# Patient Record
Sex: FEMALE | Race: WHITE
[De-identification: names, ages, dates, MRNs, and addresses within clinical notes are randomized per-mention and may not be internally consistent; named-entity substitution may affect disease eponyms.]

---

## 2018-11-09 ENCOUNTER — HOSPITAL ENCOUNTER (OUTPATIENT)
Dept: HOSPITAL 97 - ER | Age: 68
Setting detail: OBSERVATION
LOS: 4 days | Discharge: HOME | End: 2018-11-13
Attending: INTERNAL MEDICINE | Admitting: INTERNAL MEDICINE
Payer: COMMERCIAL

## 2018-11-09 VITALS — BODY MASS INDEX: 28.9 KG/M2

## 2018-11-09 DIAGNOSIS — R09.02: ICD-10-CM

## 2018-11-09 DIAGNOSIS — J44.1: Primary | ICD-10-CM

## 2018-11-09 DIAGNOSIS — I10: ICD-10-CM

## 2018-11-09 DIAGNOSIS — E78.5: ICD-10-CM

## 2018-11-09 DIAGNOSIS — J96.11: ICD-10-CM

## 2018-11-09 DIAGNOSIS — Z23: ICD-10-CM

## 2018-11-09 LAB
ALBUMIN SERPL BCP-MCNC: 4.2 G/DL (ref 3.4–5)
ALP SERPL-CCNC: 81 U/L (ref 45–117)
ALT SERPL W P-5'-P-CCNC: 42 U/L (ref 12–78)
AST SERPL W P-5'-P-CCNC: 27 U/L (ref 15–37)
BUN BLD-MCNC: 13 MG/DL (ref 7–18)
GLUCOSE SERPLBLD-MCNC: 93 MG/DL (ref 74–106)
HCT VFR BLD CALC: 46.2 % (ref 36–45)
INR BLD: 0.92
LYMPHOCYTES # SPEC AUTO: 0.7 K/UL (ref 0.7–4.9)
MAGNESIUM SERPL-MCNC: 1.9 MG/DL (ref 1.8–2.4)
MCH RBC QN AUTO: 31.8 PG (ref 27–35)
MCV RBC: 95.2 FL (ref 80–100)
NT-PROBNP SERPL-MCNC: 285 PG/ML (ref ?–125)
PMV BLD: 8 FL (ref 7.6–11.3)
POTASSIUM SERPL-SCNC: 4.1 MMOL/L (ref 3.5–5.1)
RBC # BLD: 4.85 M/UL (ref 3.86–4.86)
TROPONIN (EMERG DEPT USE ONLY): 0.11 NG/ML (ref 0–0.04)

## 2018-11-09 PROCEDURE — 99285 EMERGENCY DEPT VISIT HI MDM: CPT

## 2018-11-09 PROCEDURE — 93005 ELECTROCARDIOGRAM TRACING: CPT

## 2018-11-09 PROCEDURE — 90670 PCV13 VACCINE IM: CPT

## 2018-11-09 PROCEDURE — 87040 BLOOD CULTURE FOR BACTERIA: CPT

## 2018-11-09 PROCEDURE — 87070 CULTURE OTHR SPECIMN AEROBIC: CPT

## 2018-11-09 PROCEDURE — 94760 N-INVAS EAR/PLS OXIMETRY 1: CPT

## 2018-11-09 PROCEDURE — 96372 THER/PROPH/DIAG INJ SC/IM: CPT

## 2018-11-09 PROCEDURE — 84484 ASSAY OF TROPONIN QUANT: CPT

## 2018-11-09 PROCEDURE — 94640 AIRWAY INHALATION TREATMENT: CPT

## 2018-11-09 PROCEDURE — 96374 THER/PROPH/DIAG INJ IV PUSH: CPT

## 2018-11-09 PROCEDURE — 83735 ASSAY OF MAGNESIUM: CPT

## 2018-11-09 PROCEDURE — 96375 TX/PRO/DX INJ NEW DRUG ADDON: CPT

## 2018-11-09 PROCEDURE — 80076 HEPATIC FUNCTION PANEL: CPT

## 2018-11-09 PROCEDURE — 87205 SMEAR GRAM STAIN: CPT

## 2018-11-09 PROCEDURE — 87804 INFLUENZA ASSAY W/OPTIC: CPT

## 2018-11-09 PROCEDURE — 80048 BASIC METABOLIC PNL TOTAL CA: CPT

## 2018-11-09 PROCEDURE — 71045 X-RAY EXAM CHEST 1 VIEW: CPT

## 2018-11-09 PROCEDURE — 36415 COLL VENOUS BLD VENIPUNCTURE: CPT

## 2018-11-09 PROCEDURE — 71275 CT ANGIOGRAPHY CHEST: CPT

## 2018-11-09 PROCEDURE — 85025 COMPLETE CBC W/AUTO DIFF WBC: CPT

## 2018-11-09 PROCEDURE — 83880 ASSAY OF NATRIURETIC PEPTIDE: CPT

## 2018-11-09 PROCEDURE — 85610 PROTHROMBIN TIME: CPT

## 2018-11-09 PROCEDURE — 82805 BLOOD GASES W/O2 SATURATION: CPT

## 2018-11-09 RX ADMIN — ENOXAPARIN SODIUM SCH: 80 INJECTION SUBCUTANEOUS at 20:55

## 2018-11-09 RX ADMIN — Medication SCH ML: at 21:08

## 2018-11-09 RX ADMIN — FAMOTIDINE SCH: 10 INJECTION, SOLUTION INTRAVENOUS at 20:55

## 2018-11-09 RX ADMIN — METOPROLOL TARTRATE SCH: 25 TABLET ORAL at 18:00

## 2018-11-09 RX ADMIN — METHYLPREDNISOLONE SODIUM SUCCINATE SCH: 40 INJECTION, POWDER, FOR SOLUTION INTRAMUSCULAR; INTRAVENOUS at 17:00

## 2018-11-09 RX ADMIN — LEVOFLOXACIN SCH: 5 INJECTION, SOLUTION INTRAVENOUS at 16:00

## 2018-11-09 NOTE — EKG
Test Date:    2018-11-09               Test Time:    16:17:35

Technician:   VINNIE                                     

                                                     

MEASUREMENT RESULTS:                                       

Intervals:                                           

Rate:         91                                     

VA:           140                                    

QRSD:         80                                     

QT:           398                                    

QTc:          489                                    

Axis:                                                

P:            73                                     

VA:           140                                    

QRS:          72                                     

T:            70                                     

                                                     

INTERPRETIVE STATEMENTS:                                       

                                                     

Normal sinus rhythm

Normal ECG

Compared to ECG 11/09/2018 14:59:01

No significant changes



Electronically Signed On 11-09-18 18:44:40 CST by Basilio Joe

## 2018-11-09 NOTE — RAD REPORT
EXAM DESCRIPTION:  RAD - Chest Single View - 11/9/2018 3:06 pm

 

CLINICAL HISTORY:  Cough, COPD

 

COMPARISON:  March 2016

 

TECHNIQUE:  AP portable chest image was obtained 1602 hours .

 

FINDINGS:  No focal consolidation, mass or significant failure finding. Fibrotic lung pattern is melissa
lar to comparison. Heart and vasculature are normal. No measurable pleural effusion and no pneumothor
ax. No acute bony abnormality seen. No acute aortic findings suspected.

 

IMPRESSION:  No acute cardiopulmonary process.

 

Fibrotic lung pattern similar to comparison.

## 2018-11-09 NOTE — ER
Nurse's Notes                                                                                     

 [facility]                                                                

Name: Rhonda Boyd                                                                               

Age: 68 yrs                                                                                       

Sex: Female                                                                                       

: 1950                                                                                   

MRN: [id number]                                                                                   

Arrival Date: 2018                                                                          

Time: 14:03                                                                                       

Account#: [id number]                                                                            

Bed 24                                                                                            

Private MD: Jaleel Taveras F                                                                     

Diagnosis: Chronic obstructive pulmonary disease with (acute) exacerbation;Hypoxemia              

                                                                                                  

Presentation:                                                                                     

                                                                                             

14:06 Presenting complaint: Patient states: Fever for 2 days with low SPO2 today. Denies SOB. aj  

      Nasal congestion, sinus and right ear pain. Transition of care: patient was not             

      received from another setting of care. Onset of symptoms was 2018. Risk        

      Assessment: Do you want to hurt yourself or someone else? Patient reports no desire to      

      harm self or others. Initial Sepsis Screen: Does the patient meet any 2 criteria? RR >      

      20 per min. HR > 90 bpm. Yes Does the patient have a suspected source of infection?         

      Yes:. Care prior to arrival: None.                                                          

14:06 Method Of Arrival: Ambulatory                                                           aj  

14:06 Acuity: ERMELINDA 3                                                                           aj  

                                                                                                  

Triage Assessment:                                                                                

14:09 General: Appears in no apparent distress. comfortable, Behavior is calm, cooperative,   aj  

      appropriate for age. Pain: Denies pain. EENT: Reports nasal congestion nasal discharge.     

      Neuro: Oriented to person, place, time, situation, Appropriate for age. Respiratory:        

      Airway is patent Respiratory effort is even, unlabored, Respiratory pattern is regular,     

      symmetrical. Respiratory: Reports cough that is productive. Derm: Skin is intact, is        

      healthy with good turgor, Skin is pink, warm \T\ dry. normal.                               

                                                                                                  

Historical:                                                                                       

- Allergies:                                                                                      

14:09 No Known Allergies;                                                                     aj  

- Home Meds:                                                                                      

14:09 Lisinopril Oral [Active]; atorvastatin oral oral [Active]; Albuterol Inhl [Active];     aj  

      Advair Diskus Inhl [Active];                                                                

- PMHx:                                                                                           

14:09 COPD; Hypertension; Hyperlipidemia;                                                     aj  

- PSHx:                                                                                           

14:09 Tubal ligation;                                                                         aj  

                                                                                                  

- Immunization history:: Adult Immunizations up to date.                                          

- Social history:: Smoking status: Patient uses tobacco products, denies chronic                  

  smoking, but will smoke occasionally.                                                           

- Ebola Screening: : Patient negative for fever greater than or equal to 101.5 degrees            

  Fahrenheit, and additional compatible Ebola Virus Disease symptoms Patient denies               

  exposure to infectious person Patient denies travel to an Ebola-affected area in the            

  21 days before illness onset No symptoms or risks identified at this time.                      

- Family history:: not pertinent.                                                                 

                                                                                                  

                                                                                                  

Screenin:05 Abuse screen: Denies threats or abuse. Nutritional screening: No deficits noted.        dm5 

      Tuberculosis screening: No symptoms or risk factors identified. Fall Risk None              

      identified.                                                                                 

                                                                                                  

Assessment:                                                                                       

15:00 General: Appears comfortable, slender, well groomed, well developed, well nourished,    dm5 

      Behavior is calm, cooperative, appropriate for age, Reports fever for 1-2 days. Pain:       

      Complains of pain in chest. Neuro: Level of Consciousness is awake, alert, obeys            

      commands, Oriented to person, place, time, situation, Appropriate for age.                  

      Cardiovascular: Patient's skin is warm and dry. Respiratory: Airway is patent               

      Respiratory effort is even, Respiratory pattern is regular. Respiratory: Reports            

      shortness of breath since 2 days cough that is productive, persistent. GI: No signs         

      and/or symptoms were reported involving the gastrointestinal system. : No signs           

      and/or symptoms were reported regarding the genitourinary system. EENT: No signs and/or     

      symptoms were reported regarding the EENT system. Derm: No signs and/or symptoms            

      reported regarding the dermatologic system.                                                 

16:05 Reassessment: No changes from previously documented assessment. Patient and/or family   tl3 

      updated on plan of care and expected duration. Pain level reassessed. Patient is alert,     

      oriented x 3, equal unlabored respirations, skin warm/dry/pink. pt oxygen levels            

      dropped to 88% after nebs, 2 L/M oxygen brought up to 94-96%.                               

17:12 Reassessment: No changes from previously documented assessment. Patient and/or family   tl3 

      updated on plan of care and expected duration. Pain level reassessed. Patient is alert,     

      oriented x 3, equal unlabored respirations, skin warm/dry/pink. no needs at this time.      

                                                                                                  

Vital Signs:                                                                                      

14:09  / 77; Pulse 94; Resp 22; Temp 98.9; Pulse Ox 87% on R/A; Weight 69.4 kg; Height  aj  

      5 ft. 2 in. (157.48 cm);                                                                    

16:05  / 99; Pulse 95; Resp 18; Pulse Ox 95% ;                                          tl3 

17:12  / 57; Pulse 88; Resp 18; Pulse Ox 97% on 2 lpm NC;                               tl3 

14:09 Body Mass Index 27.98 (69.40 kg, 157.48 cm)                                               

                                                                                                  

ED Course:                                                                                        

14:03 Patient arrived in ED.                                                                  mr  

14:04 Jaleel Taveras MD is Private Physician.                                                mr  

14:07 Triage completed.                                                                       aj  

14:09 Arm band placed on right wrist. Patient placed in an exam room.                         aj  

14:22 Chava Austin MD is Attending Physician.                                             maggie 

14:43 Zoila Jeronimo, RN is Primary Nurse.                                                     tl3 

14:58 Jaleel Taveras MD is Hospitalizing Provider.                                           maggie 

15:00 Inserted saline lock: 20 gauge in right antecubital area, using aseptic technique.      dm5 

      Blood collected.                                                                            

15:07 XRAY Chest (1 view) In Process Unspecified.                                             EDMS

15:09 EKG done, by EKG tech. reviewed by Chava Austin MD.                                   3 

16:21 REPEAT EKG DONE.                                                                        sm3 

17:05 Patient has correct armband on for positive identification. Placed in gown. Call light  dm5 

      in reach. Side rails up X 1. Door closed. Lights dimmed. Warm blanket given.                

17:05 No provider procedures requiring assistance completed.                                  dm5 

17:22 Patient admitted, IV remains in place.                                                  tl3 

                                                                                                  

Administered Medications:                                                                         

11                                                                                             

16:00 Drug: levofloxacin 500 mg Volume: 100 ml; Route: IVPB; Infused Over: 60 mins; Site:     tl3 

      right forearm; Delivery: Primary tubing;                                                    

                                                                                             

15:16 Drug: SOLU-Medrol 125 mg Route: IVP; Infused Over: 2 mins; Site: right antecubital;     tl3 

15:16 Drug: Albuterol - atroVENT (3:1) (2.5 mg - 0.5 mg) 3 ml Route: Nebulizer;               tl3 

23:59 Follow up: Response: No adverse reaction                                                tl3 

15:20 Drug: NS 0.9% 1000 ml Route: IV; Rate: 75 ml/hr; Site: right antecubital; Delivery:     tl3 

      Primary tubing;                                                                             

17:03 Drug: Lovenox 70 mg Route: Sub-Q; Site: abdomen;                                        dm5 

17:04 Follow up: Response: No adverse reaction                                                dm5 

17:03 Drug: Pepcid 20 mg Route: IVP; Site: right antecubital;                                 dm5 

17:04 Follow up: Response: No adverse reaction                                                dm5 

17:04 Drug: Lopressor 25 mg Route: PO;                                                        dm5 

23:58 Follow up: Response: No adverse reaction                                                tl3 

17:04 Drug: Aspirin Chewable Tablet 324 mg Route: PO;                                         dm5 

17:05 Follow up: Response: No adverse reaction                                                dm5 

                                                                                                  

                                                                                                  

Outcome:                                                                                          

14:58 Decision to Hospitalize by Provider.                                                    maggie 

17:22 Admitted to Tele accompanied by tech, via wheelchair, with chart, Report called to      tl3 

      AMY Murphy                                                                                     

17:22 Condition: stable                                                                           

17:22 Instructed on the need for admit, Demonstrated understanding of instructions.               

18:22 Patient left the ED.                                                                    tl3 

                                                                                                  

Signatures:                                                                                       

Dispatcher [employer]                           EDMarleen Reardon RN RN dm5                                                  

Melvina Marlow RN RN aj Anderson, Corey, MD MD cha Rivera, Mildred Jeronimo, AMY Cummings RN   tl3                                                  

Indy Toledo                              3                                                  

                                                                                                  

**************************************************************************************************

## 2018-11-09 NOTE — EKG
Test Date:    2018-11-09               Test Time:    14:59:01

Technician:   VINNIE                                     

                                                     

MEASUREMENT RESULTS:                                       

Intervals:                                           

Rate:         88                                     

HI:           140                                    

QRSD:         78                                     

QT:           384                                    

QTc:          464                                    

Axis:                                                

P:            77                                     

HI:           140                                    

QRS:          75                                     

T:            76                                     

                                                     

INTERPRETIVE STATEMENTS:                                       

                                                     

Normal sinus rhythm

Normal ECG

Compared to ECG 12/05/2003 11:25:00

No significant changes



Electronically Signed On 11-09-18 18:44:44 CST by Basilio Joe

## 2018-11-09 NOTE — EDPHYS
Physician Documentation                                                                           

 [facility]                                                                

Name: Rhonda Boyd                                                                               

Age: 68 yrs                                                                                       

Sex: Female                                                                                       

: 1950                                                                                   

MRN: [id number]                                                                                   

Arrival Date: 2018                                                                          

Time: 14:03                                                                                       

Account#: [id number]                                                                            

Bed 24                                                                                            

Private MD: Jaleel Taveras F                                                                     

ED Physician Chava Austin                                                                      

HPI:                                                                                              

                                                                                             

14:45 This 68 yrs old  Female presents to ER via Ambulatory with complaints of Low   maggie 

      O2.                                                                                         

14:45 The patient has shortness of breath at rest, with light activity. Onset: The            maggie 

      symptoms/episode began/occurred 3 day(s) ago. Duration: The symptoms are continuous,        

      and are steadily getting worse. The patient's shortness of breath is aggravated by          

      exertion, supine position, talking, walking, is alleviated by rest, sitting up,             

      application of supplemental oxygen. The patient presents to the emergency department        

      with wheezing, Current therapy: albuterol inhaler, steroid inhaler. Modifying factors:      

      The symptoms are alleviated by nothing.                                                     

                                                                                                  

Historical:                                                                                       

- Allergies:                                                                                      

14:09 No Known Allergies;                                                                     aj  

- Home Meds:                                                                                      

14:09 Lisinopril Oral [Active]; atorvastatin oral oral [Active]; Albuterol Inhl [Active];     aj  

      Advair Diskus Inhl [Active];                                                                

- PMHx:                                                                                           

14:09 COPD; Hypertension; Hyperlipidemia;                                                     aj  

- PSHx:                                                                                           

14:09 Tubal ligation;                                                                         aj  

                                                                                                  

- Immunization history:: Adult Immunizations up to date.                                          

- Social history:: Smoking status: Patient uses tobacco products, denies chronic                  

  smoking, but will smoke occasionally.                                                           

- Ebola Screening: : Patient negative for fever greater than or equal to 101.5 degrees            

  Fahrenheit, and additional compatible Ebola Virus Disease symptoms Patient denies               

  exposure to infectious person Patient denies travel to an Ebola-affected area in the            

  21 days before illness onset No symptoms or risks identified at this time.                      

- Family history:: not pertinent.                                                                 

                                                                                                  

                                                                                                  

ROS:                                                                                              

14:45 Constitutional: Negative for fever, chills, and weight loss, Eyes: Negative for injury, maggie 

      pain, redness, and discharge, ENT: Negative for injury, pain, and discharge, Neck:          

      Negative for injury, pain, and swelling, Cardiovascular: Negative for chest pain,           

      palpitations, and edema, Abdomen/GI: Negative for abdominal pain, nausea, vomiting,         

      diarrhea, and constipation, Back: Negative for injury and pain, : Negative for            

      injury, bleeding, discharge, and swelling, MS/Extremity: Negative for injury and            

      deformity, Skin: Negative for injury, rash, and discoloration, Neuro: Negative for          

      headache, weakness, numbness, tingling, and seizure, Psych: Negative for depression,        

      anxiety, suicide ideation, homicidal ideation, and hallucinations, Allergy/Immunology:      

      Negative for hives, rash, and allergies, Endocrine: Negative for neck swelling,             

      polydipsia, polyuria, polyphagia, and marked weight changes, Hematologic/Lymphatic:         

      Negative for swollen nodes, abnormal bleeding, and unusual bruising.                        

14:45 Respiratory: Positive for cough, shortness of breath, at rest.                              

                                                                                                  

Exam:                                                                                             

14:45 Constitutional:  This is a well developed, well nourished patient who is awake, alert,  maggie 

      and in no acute distress. Head/Face:  Normocephalic, atraumatic. Eyes:  Pupils equal        

      round and reactive to light, extra-ocular motions intact.  Lids and lashes normal.          

      Conjunctiva and sclera are non-icteric and not injected.  Cornea within normal limits.      

      Periorbital areas with no swelling, redness, or edema. ENT:  Nares patent. No nasal         

      discharge, no septal abnormalities noted.  Tympanic membranes are normal and external       

      auditory canals are clear.  Oropharynx with no redness, swelling, or masses, exudates,      

      or evidence of obstruction, uvula midline.  Mucous membranes moist. Neck:  Trachea          

      midline, no thyromegaly or masses palpated, and no cervical lymphadenopathy.  Supple,       

      full range of motion without nuchal rigidity, or vertebral point tenderness.  No            

      Meningismus. Chest/axilla:  Normal chest wall appearance and motion.  Nontender with no     

      deformity.  No lesions are appreciated. Cardiovascular:  Regular rate and rhythm with a     

      normal S1 and S2.  No gallops, murmurs, or rubs.  Normal PMI, no JVD.  No pulse             

      deficits. Abdomen/GI:  Soft, non-tender, with normal bowel sounds.  No distension or        

      tympany.  No guarding or rebound.  No evidence of tenderness throughout. Back:  No          

      spinal tenderness.  No costovertebral tenderness.  Full range of motion. Female :         

      Normal external genitalia. Skin:  Warm, dry with normal turgor.  Normal color with no       

      rashes, no lesions, and no evidence of cellulitis. MS/ Extremity:  Pulses equal, no         

      cyanosis.  Neurovascular intact.  Full, normal range of motion. Neuro:  Awake and           

      alert, GCS 15, oriented to person, place, time, and situation.  Cranial nerves II-XII       

      grossly intact.  Motor strength 5/5 in all extremities.  Sensory grossly intact.            

      Cerebellar exam normal.  Normal gait. Psych:  Awake, alert, with orientation to person,     

      place and time.  Behavior, mood, and affect are within normal limits.                       

14:45 Respiratory: moderate respiratory distress is noted,  Respirations: labored breathing,      

      that is mild, Breath sounds: bronchial sounds, that are moderate, are scattered,            

      decreased breath sounds, that are moderate, rhonchi, wheezing: inspiratory expiratory       

15:07 Musculoskeletal/extremity: DVT Exam: No signs of deep vein thrombosis. no pain, no      maggie 

      swelling, no tenderness, negative Homans' sign noted on exam, no appreciated bluish         

      discoloration, no erythema, no increased warmth.                                            

                                                                                                  

Vital Signs:                                                                                      

14:09  / 77; Pulse 94; Resp 22; Temp 98.9; Pulse Ox 87% on R/A; Weight 69.4 kg; Height  aj  

      5 ft. 2 in. (157.48 cm);                                                                    

16:05  / 99; Pulse 95; Resp 18; Pulse Ox 95% ;                                          tl3 

17:12  / 57; Pulse 88; Resp 18; Pulse Ox 97% on 2 lpm NC;                               tl3 

14:09 Body Mass Index 27.98 (69.40 kg, 157.48 cm)                                               

                                                                                                  

MDM:                                                                                              

14:22 Patient medically screened.                                                             [facility] 

14:48 Data reviewed: vital signs, nurses notes, lab test result(s), EKG, radiologic studies,  [facility] 

      plain films.                                                                                

                                                                                                  

                                                                                             

14:44 Order name: Basic Metabolic Panel; Complete Time: 16:01                                 [facility] 

                                                                                             

14:44 Order name: CBC with Diff; Complete Time: 16:01                                         [facility] 

                                                                                             

14:44 Order name: LFT's; Complete Time: 16:                                                 [facility] 

                                                                                             

14:44 Order name: Magnesium; Complete Time: 16:                                             [facility] 

                                                                                             

14:44 Order name: NT PRO-BNP; Complete Time: 16:                                            [facility] 

                                                                                             

14:44 Order name: PT-INR; Complete Time: 16:                                                [facility] 

                                                                                             

14:44 Order name: Troponin (emerg Dept Use Only); Complete Time: 16:                        [facility] 

                                                                                             

14:44 Order name: Blood Culture Adult (2)                                                     [facility] 

                                                                                             

14:44 Order name: Influenza Screen (a \T\ B); Complete Time: 16:01                              [facility]

                                                                                             

14:59 Order name: Urine Culture                                                               [facility] 

                                                                                             

15:17 Order name: Basic Metabolic Panel                                                       [facility]

                                                                                             

15:17 Order name: Basic Metabolic Panel                                                       [facility]

                                                                                             

15:17 Order name: CBC with Automated Diff                                                     [facility]

                                                                                             

15:17 Order name: CBC with Automated Diff                                                     [facility]

                                                                                             

14:44 Order name: XRAY Chest (1 view); Complete Time: 16:01                                   [facility] 

                                                                                             

15:17 Order name: NT PRO-BNP                                                                  [facility]

                                                                                             

15:17 Order name: NT PRO-BNP                                                                  [facility]

                                                                                             

15:17 Order name: Troponin I                                                                  [facility]

                                                                                             

15:17 Order name: Troponin I                                                                  [facility]

                                                                                             

15:18 Order name: Troponin I                                                                  [facility]

                                                                                             

15:18 Order name: Chest Single View                                                           [facility]

                                                                                             

15:18 Order name: Chest Single View                                                           [facility]

                                                                                             

14:44 Order name: EKG; Complete Time: 14:45                                                   [facility] 

                                                                                             

14:44 Order name: Cardiac monitoring; Complete Time: 15:18                                    [facility] 

                                                                                             

14:44 Order name: EKG - Nurse/Tech; Complete Time: 15:18                                      [facility] 

                                                                                             

14:44 Order name: IV Saline Lock; Complete Time: 15:18                                        [facility] 

                                                                                             

14:44 Order name: Labs collected and sent; Complete Time: 15:18                               [facility] 

                                                                                             

14:44 Order name: O2 Per Protocol; Complete Time: 15:17                                       [facility] 

                                                                                             

14:44 Order name: O2 Sat Monitoring; Complete Time: 15:18                                     [facility] 

                                                                                             

14:59 Order name: Urine Dipstick-Ancillary (obtain specimen); Complete Time: 23:58            [facility] 

                                                                                             

15:17 Order name: Heart Healthy                                                               [facility]

                                                                                             

15:17 Order name: EKG Electrocardiogram                                                       [facility]

                                                                                             

15:17 Order name: EKG Electrocardiogram                                                       [facility]

                                                                                             

16:17 Order name: EKG; Complete Time: 16:35                                                   [facility] 

                                                                                             

16:17 Order name: EKG - Nurse/Tech; Complete Time: 16:35                                      [facility] 

                                                                                                  

Administered Medications:                                                                         

                                                                                             

16:00 Drug: levofloxacin 500 mg Volume: 100 ml; Route: IVPB; Infused Over: 60 mins; Site:     tl3 

      right forearm; Delivery: Primary tubing;                                                    

                                                                                             

15:16 Drug: SOLU-Medrol 125 mg Route: IVP; Infused Over: 2 mins; Site: right antecubital;     tl3 

15:16 Drug: Albuterol - atroVENT (3:1) (2.5 mg - 0.5 mg) 3 ml Route: Nebulizer;               tl3 

23:59 Follow up: Response: No adverse reaction                                                tl3 

15:20 Drug: NS 0.9% 1000 ml Route: IV; Rate: 75 ml/hr; Site: right antecubital; Delivery:     tl3 

      Primary tubing;                                                                             

17:03 Drug: Lovenox 70 mg Route: Sub-Q; Site: abdomen;                                        dm5 

17:04 Follow up: Response: No adverse reaction                                                dm5 

17:03 Drug: Pepcid 20 mg Route: IVP; Site: right antecubital;                                 dm5 

17:04 Follow up: Response: No adverse reaction                                                dm5 

17:04 Drug: Lopressor 25 mg Route: PO;                                                        dm5 

23:58 Follow up: Response: No adverse reaction                                                tl3 

17:04 Drug: Aspirin Chewable Tablet 324 mg Route: PO;                                         dm5 

17:05 Follow up: Response: No adverse reaction                                                dm5 

                                                                                                  

                                                                                                  

Disposition:                                                                                      

18 14:58 Hospitalization ordered by Jaleel Taveras for Observation. Preliminary             

  diagnosis are Chronic obstructive pulmonary disease with (acute)                                

  exacerbation, Hypoxemia.                                                                        

- Bed requested for Telemetry/MedSurg (observation).                                              

- Status is Observation.                                                                      tl3 

- Condition is Fair.                                                                              

- Problem is new.                                                                                 

- Symptoms have improved.                                                                         

UTI on Admission? No                                                                              

                                                                                                  

                                                                                                  

                                                                                                  

Signatures:                                                                                       

Dispatcher [employer]                           [facility]                                                 

Marleen Munguia RN RN   dm5                                                  

Dolores Perkins RN                        Melvina Tran RN RN aj Anderson, Corey, MD MD cha Lowrey, Tammy, RN                       RN   tl3                                                  

                                                                                                  

Corrections: (The following items were deleted from the chart)                                    

15:48 14:58 Hospitalization Ordered by Jaleel Taveras MD for Observation. Preliminary          dw  

      diagnosis is Chronic obstructive pulmonary disease with (acute) exacerbation;               

      Hypoxemia. Bed requested for Telemetry/MedSurg (observation). Status is Observation.        

      Condition is Fair. Problem is new. Symptoms have improved. UTI on Admission? No. maggie        

18:22 15:48 2018 14:58 Hospitalization Ordered by Jaleel Taveras MD for Observation.     tl3 

      Preliminary diagnosis is Chronic obstructive pulmonary disease with (acute)                 

      exacerbation; Hypoxemia. Bed requested for Telemetry/MedSurg (observation). Status is       

      Observation. Condition is Fair. Problem is new. Symptoms have improved. UTI on              

      Admission? No. dw                                                                           

                                                                                                  

**************************************************************************************************

## 2018-11-10 LAB
BLD SMEAR INTERP: (no result)
BUN BLD-MCNC: 12 MG/DL (ref 7–18)
GLUCOSE SERPLBLD-MCNC: 138 MG/DL (ref 74–106)
HCT VFR BLD CALC: 43.1 % (ref 36–45)
LYMPHOCYTES # SPEC AUTO: 0.3 K/UL (ref 0.7–4.9)
MCH RBC QN AUTO: 31.8 PG (ref 27–35)
MCV RBC: 94.7 FL (ref 80–100)
MORPHOLOGY BLD-IMP: (no result)
NT-PROBNP SERPL-MCNC: 201 PG/ML (ref ?–125)
PMV BLD: 7.8 FL (ref 7.6–11.3)
POTASSIUM SERPL-SCNC: 4.4 MMOL/L (ref 3.5–5.1)
RBC # BLD: 4.55 M/UL (ref 3.86–4.86)

## 2018-11-10 RX ADMIN — METOPROLOL TARTRATE SCH MG: 25 TABLET ORAL at 17:40

## 2018-11-10 RX ADMIN — ENOXAPARIN SODIUM SCH MG: 80 INJECTION SUBCUTANEOUS at 08:19

## 2018-11-10 RX ADMIN — ENOXAPARIN SODIUM SCH MG: 80 INJECTION SUBCUTANEOUS at 21:29

## 2018-11-10 RX ADMIN — ALBUTEROL SULFATE PRN MG: 2.5 SOLUTION RESPIRATORY (INHALATION) at 20:35

## 2018-11-10 RX ADMIN — METHYLPREDNISOLONE SODIUM SUCCINATE SCH MG: 40 INJECTION, POWDER, FOR SOLUTION INTRAMUSCULAR; INTRAVENOUS at 08:19

## 2018-11-10 RX ADMIN — LEVOFLOXACIN SCH MLS: 5 INJECTION, SOLUTION INTRAVENOUS at 16:14

## 2018-11-10 RX ADMIN — METHYLPREDNISOLONE SODIUM SUCCINATE SCH MG: 40 INJECTION, POWDER, FOR SOLUTION INTRAMUSCULAR; INTRAVENOUS at 01:25

## 2018-11-10 RX ADMIN — METOPROLOL TARTRATE SCH MG: 25 TABLET ORAL at 05:38

## 2018-11-10 RX ADMIN — Medication SCH ML: at 21:30

## 2018-11-10 RX ADMIN — FAMOTIDINE SCH MG: 10 INJECTION, SOLUTION INTRAVENOUS at 08:18

## 2018-11-10 RX ADMIN — ASPIRIN SCH MG: 81 TABLET, COATED ORAL at 08:19

## 2018-11-10 RX ADMIN — METHYLPREDNISOLONE SODIUM SUCCINATE SCH MG: 40 INJECTION, POWDER, FOR SOLUTION INTRAMUSCULAR; INTRAVENOUS at 16:15

## 2018-11-10 RX ADMIN — IPRATROPIUM BROMIDE PRN MG: 0.5 SOLUTION RESPIRATORY (INHALATION) at 22:35

## 2018-11-10 RX ADMIN — Medication SCH ML: at 08:21

## 2018-11-10 RX ADMIN — FAMOTIDINE SCH MG: 10 INJECTION, SOLUTION INTRAVENOUS at 21:30

## 2018-11-10 NOTE — RAD REPORT
EXAM DESCRIPTION:  Shanda Single View11/10/2018 6:12 am

 

CLINICAL HISTORY:  11/9

 

COMPARISON:  none

 

FINDINGS:   The lungs appear clear of acute infiltrate. The heart is normal size

 

IMPRESSION:   No acute abnormalities displayed

## 2018-11-10 NOTE — HP
Date of Admission:  11/09/2018



History Of Present Illness:  A 68-year-old female who came to emergency room, she had a complaint gayle
t she had been having running fever.  She did not take her temperature, but she has felt febrile.  Sh
e had no chills.  She was feeling tired and fatigued.  She was found to have hypoxia with COPD exacer
lu, was admitted for that.  She denied any chest pain.  She had no nausea, vomiting, and voiced n
o other complaints.



Review of Systems:

Cardiovascular:  No complaint. 

Respiratory:  As above. 

Genitourinary:  No complaint. 

Skeletomuscular:  No complaint. 

Neurological:  No complaint. 

Gastrointestinal:  No complaint.



Past Medical History:  Hypertension, hyperlipidemia, COPD.



Social History:  The patient said she just stopped smoking about 6 weeks ago.  Denies alcohol or IV d
rug abuse history.



Medications:  Include lisinopril, atorvastatin, and albuterol.  Dosage not verified and also on Advai
r Diskus.



Allergies:  NO KNOWN DRUG ALLERGIES.



Physical Examination:

General:  The patient is sitting, in no acute distress.  She is feeling well by the time of interview
ing her, her room air was 93 pulse ox saturation, temperature 98.1, heart pulse 77, blood pressure 13
0/79. 

Heart:  Regular rate and rhythm. 

Chest:  Clear to auscultation. 

Abdomen:  Soft, benign, nontender.  Bowel sounds are normoactive. 

Extremities:  No edema.  No cyanosis.  Peripheral pulses are felt. 

Neurologic:  Alert, oriented, nonfocal.  Grossly intact.



Diagnostic Data:  Chest x-ray, no acute pathology.  She has a fibrotic pattern, chronic.  EKG, normal
 sinus rhythm.



Laboratory Data:  White cell count dropped from 3.9 to 1.7, hemoglobin 14.5, hematocrit 43.1, platele
ts 166.  Chemistry noted the patient's troponin was 0.11, rapid, and then the troponin 1 was less gayle
n 0.02 and 0.09.



Assessment And Plan:  Chronic obstructive pulmonary disease exacerbation.  The patient was admitted, 
put on beta 2 agonist breathing treatments, oxygen protocol, was put on ipratropium.  Cardiology was 
consulted.  Dr. Joe has seen the patient, thought that her increased troponin is not related to a
ny cardiac issue; however, the patient with leukopenia drop in her white cell count, I would like to 
keep the patient for 1 more day for observation.  We will repeat the CBC.  We will put her on IV Roce
phin, however, if her white cell count showed no further dropping and she stays clinically stable, we
 will discharge in the morning.  Look orders for details.





MARILEE/JOYCE

DD:  11/10/2018 15:38:21Voice ID:  [id number]

## 2018-11-11 LAB
COHGB MFR BLDA: 1.2 % (ref 0–1.5)
HCT VFR BLD CALC: 42.3 % (ref 36–45)
LYMPHOCYTES # SPEC AUTO: 0.6 K/UL (ref 0.7–4.9)
MCH RBC QN AUTO: 31.9 PG (ref 27–35)
MCV RBC: 94 FL (ref 80–100)
OXYHGB MFR BLDA: 81.5 % (ref 94–97)
PMV BLD: 8.2 FL (ref 7.6–11.3)
RBC # BLD: 4.5 M/UL (ref 3.86–4.86)
SAO2 % BLDA: 83 % (ref 92–98.5)

## 2018-11-11 RX ADMIN — METHYLPREDNISOLONE SODIUM SUCCINATE SCH MG: 40 INJECTION, POWDER, FOR SOLUTION INTRAMUSCULAR; INTRAVENOUS at 00:16

## 2018-11-11 RX ADMIN — ALBUTEROL SULFATE PRN MG: 2.5 SOLUTION RESPIRATORY (INHALATION) at 08:50

## 2018-11-11 RX ADMIN — METHYLPREDNISOLONE SODIUM SUCCINATE SCH MG: 40 INJECTION, POWDER, FOR SOLUTION INTRAMUSCULAR; INTRAVENOUS at 08:34

## 2018-11-11 RX ADMIN — METHYLPREDNISOLONE SODIUM SUCCINATE SCH MG: 40 INJECTION, POWDER, FOR SOLUTION INTRAMUSCULAR; INTRAVENOUS at 17:05

## 2018-11-11 RX ADMIN — METOPROLOL TARTRATE SCH MG: 25 TABLET ORAL at 17:04

## 2018-11-11 RX ADMIN — IPRATROPIUM BROMIDE PRN MG: 0.5 SOLUTION RESPIRATORY (INHALATION) at 08:50

## 2018-11-11 RX ADMIN — ENOXAPARIN SODIUM SCH MG: 80 INJECTION SUBCUTANEOUS at 20:47

## 2018-11-11 RX ADMIN — FAMOTIDINE SCH MG: 20 TABLET, FILM COATED ORAL at 20:47

## 2018-11-11 RX ADMIN — ENOXAPARIN SODIUM SCH MG: 80 INJECTION SUBCUTANEOUS at 08:34

## 2018-11-11 RX ADMIN — Medication SCH ML: at 08:35

## 2018-11-11 RX ADMIN — LEVOFLOXACIN SCH MLS: 5 INJECTION, SOLUTION INTRAVENOUS at 17:04

## 2018-11-11 RX ADMIN — FAMOTIDINE SCH MG: 10 INJECTION, SOLUTION INTRAVENOUS at 08:34

## 2018-11-11 RX ADMIN — IPRATROPIUM BROMIDE PRN MG: 0.5 SOLUTION RESPIRATORY (INHALATION) at 14:00

## 2018-11-11 RX ADMIN — METOPROLOL TARTRATE SCH MG: 25 TABLET ORAL at 05:47

## 2018-11-11 RX ADMIN — ASPIRIN SCH MG: 81 TABLET, COATED ORAL at 08:34

## 2018-11-11 RX ADMIN — Medication SCH ML: at 20:49

## 2018-11-11 RX ADMIN — ALBUTEROL SULFATE PRN MG: 2.5 SOLUTION RESPIRATORY (INHALATION) at 14:00

## 2018-11-11 NOTE — RAD REPORT
EXAM DESCRIPTION:  CT - Chest For Pe Angio - 11/11/2018 1:40 pm

 

CLINICAL HISTORY:   Chest pain, shortness of breath

 

COMPARISON:  Chest film November 10

 

TECHNIQUE:  Dynamically enhanced 3 mm thick images of the chest were obtained during administration o
f approximately 150mL Isovue 370 IV contrast. Coronal and oblique MIP reconstruction images were gene
rated and reviewed. Exam utilizes a protocol to evaluate the pulmonary arterial tree.

 

All CT scans are performed using dose optimization technique as appropriate and may include automated
 exposure control or mA/KV adjustment according to patient size.

 

FINDINGS:  No pulmonary emboli are identified.

 

The aorta as imaged shows no acute or suspicious finding. No pericardial thickening or effusion.

 

No infiltrate or mass in the lung parenchyma. No pleural effusion or pleural thickening. Minimal scar
ring or atelectasis at each lung base.

 

No mediastinal or hilar suspicious masses. No chest wall masses or abnormal axillary lymphadenopathy.


 

IMPRESSION:  No pulmonary emboli identified.

 

No other significant or suspicious findings.

## 2018-11-11 NOTE — CON
Reason For Consultation:  Chest pain, elevated troponin, shortness of breath, and chronic obstructive
 pulmonary disease.



History Of Present Illness:  Ms. Boyd is 68, has a history of COPD, hypertension, dyslipidemia, came
 in with fever, shortness of breath, COPD, was being treated for bronchitis, was found to have a trop
onin of 0.11.  Chest pain is sharp, atypical, and pleuritic.  No nausea, vomiting, diaphoresis, PND, 
orthopnea, pedal edema, palpitations, or syncope.



Past Medical History:  Includes COPD, hypertension, and dyslipidemia.



Allergies:  NONE.



Review of Systems:

Negative.



Social History:  Negative.



Family History:  Negative.



Medications:  At home include lisinopril, Lipitor, and inhalers.



Physical Examination:

Vital Signs:  Stable.  Afebrile. 

HEENT:  Negative. 

Neck:  Supple.  No bruit. 

Chest:  Clear to auscultation and percussion. 

Cardiac:  Reveals a regular rhythm and rate.  No murmurs, gallops, or rubs. 

Abdomen:  Benign. 

Extremities: Revealed no clubbing, cyanosis, or edema.



Diagnostic Data:  Troponin was 0.11.  Chest x-ray is negative.



Impression And Plan:  

1.Elevated troponin, possibly secondary to chronic obstructive pulmonary disease and hypoxia and bro
nchitis.  The patient had fever.  She is being treated with antibiotics.  Chronic obstructive pulmona
ry disease is being treated.

2.Hypertension.

3.Dyslipidemia.  I think Ms. Boyd has enough risk factors that I think an outpatient Lexiscan is in
dicated eventually.  There is an echocardiogram that is pending for today.  We will see what that rukhsana
ws prior to making final decisions.  I will continue her Lipitor and her lisinopril for her dyslipide
marques and hypertension respectively.  I will discuss the case further with Dr. Taveras.





NB/JOYCE

DD:  11/11/2018 18:01:16Voice ID:  [id number]

DT:  11/11/2018 22:15:48Report ID:  [id number]

## 2018-11-12 RX ADMIN — METHYLPREDNISOLONE SODIUM SUCCINATE SCH MG: 40 INJECTION, POWDER, FOR SOLUTION INTRAMUSCULAR; INTRAVENOUS at 08:05

## 2018-11-12 RX ADMIN — Medication SCH ML: at 21:05

## 2018-11-12 RX ADMIN — ALBUTEROL SULFATE PRN MG: 2.5 SOLUTION RESPIRATORY (INHALATION) at 09:50

## 2018-11-12 RX ADMIN — LEVOFLOXACIN SCH MLS: 5 INJECTION, SOLUTION INTRAVENOUS at 16:40

## 2018-11-12 RX ADMIN — METOPROLOL TARTRATE SCH MG: 25 TABLET ORAL at 05:14

## 2018-11-12 RX ADMIN — ASPIRIN SCH MG: 81 TABLET, COATED ORAL at 08:05

## 2018-11-12 RX ADMIN — ENOXAPARIN SODIUM SCH MG: 80 INJECTION SUBCUTANEOUS at 08:05

## 2018-11-12 RX ADMIN — Medication SCH ML: at 08:05

## 2018-11-12 RX ADMIN — METHYLPREDNISOLONE SODIUM SUCCINATE SCH MG: 40 INJECTION, POWDER, FOR SOLUTION INTRAMUSCULAR; INTRAVENOUS at 00:57

## 2018-11-12 RX ADMIN — IPRATROPIUM BROMIDE PRN MG: 0.5 SOLUTION RESPIRATORY (INHALATION) at 08:40

## 2018-11-12 RX ADMIN — METOPROLOL TARTRATE SCH MG: 25 TABLET ORAL at 17:10

## 2018-11-12 RX ADMIN — METHYLPREDNISOLONE SODIUM SUCCINATE SCH MG: 40 INJECTION, POWDER, FOR SOLUTION INTRAMUSCULAR; INTRAVENOUS at 16:40

## 2018-11-12 RX ADMIN — FAMOTIDINE SCH MG: 20 TABLET, FILM COATED ORAL at 08:05

## 2018-11-12 RX ADMIN — FAMOTIDINE SCH MG: 20 TABLET, FILM COATED ORAL at 21:05

## 2018-11-12 RX ADMIN — ENOXAPARIN SODIUM SCH MG: 80 INJECTION SUBCUTANEOUS at 21:04

## 2018-11-12 NOTE — EKG
Test Date:    2018-11-12               Test Time:    10:26:35

Technician:   JACINTO                                     

                                                     

MEASUREMENT RESULTS:                                       

Intervals:                                           

Rate:         76                                     

MO:           142                                    

QRSD:         80                                     

QT:           414                                    

QTc:          465                                    

Axis:                                                

P:            80                                     

MO:           142                                    

QRS:          81                                     

T:            65                                     

                                                     

INTERPRETIVE STATEMENTS:                                       

                                                     

Normal sinus rhythm

Normal ECG

Compared to ECG 11/09/2018 16:17:35

No significant changes



Electronically Signed On 11-12-18 14:01:12 CST by Adelso Escalante

## 2018-11-13 VITALS — TEMPERATURE: 98.9 F | DIASTOLIC BLOOD PRESSURE: 95 MMHG | SYSTOLIC BLOOD PRESSURE: 165 MMHG

## 2018-11-13 VITALS — OXYGEN SATURATION: 96 %

## 2018-11-13 RX ADMIN — ASPIRIN SCH: 81 TABLET, COATED ORAL at 08:10

## 2018-11-13 RX ADMIN — FAMOTIDINE SCH: 20 TABLET, FILM COATED ORAL at 08:11

## 2018-11-13 RX ADMIN — ENOXAPARIN SODIUM SCH: 80 INJECTION SUBCUTANEOUS at 08:10

## 2018-11-13 RX ADMIN — METOPROLOL TARTRATE SCH MG: 25 TABLET ORAL at 05:13

## 2018-11-13 RX ADMIN — Medication SCH: at 08:10

## 2018-11-13 RX ADMIN — METHYLPREDNISOLONE SODIUM SUCCINATE SCH MG: 40 INJECTION, POWDER, FOR SOLUTION INTRAMUSCULAR; INTRAVENOUS at 01:10

## 2018-11-13 RX ADMIN — METHYLPREDNISOLONE SODIUM SUCCINATE SCH: 40 INJECTION, POWDER, FOR SOLUTION INTRAMUSCULAR; INTRAVENOUS at 08:11

## 2018-11-13 NOTE — DS
Addendum:  The patient needs home oxygen because she has COPD with chronic respiratory failure.  She 
had exacerbation at this time with acute respiratory failure on top of chronic with hypoxia.





MFS/MODL

DD:  11/12/2018 18:33:18Voice ID:  [id number]

DT:  11/13/2018 04:02:25Report ID:  [id number]

## 2018-11-13 NOTE — DS
Addendum:  The patient's ABGs were noted.  The patient has chronic hypoxia.  However, we went ahead a
nd checked the chest CT scan angiography for PE that came back negative.  I think we will go ahead an
d discharge the patient on portable 2 L oxygen nasal prong along with the rest of her discharge instr
uctions before.  Look orders for details.





MFS/MODL

DD:  11/12/2018 11:16:47Voice ID:  [id number]

DT:  11/13/2018 04:03:57Report ID:  [id number]